# Patient Record
(demographics unavailable — no encounter records)

---

## 2024-12-01 NOTE — CARE PLAN
[Care Plan reviewed and provided to patient/caregiver] : Care plan reviewed and provided to patient/caregiver [Understands and communicates without difficulty] : Patient/Caregiver understands and communicates without difficulty Declined

## 2024-12-01 NOTE — HISTORY OF PRESENT ILLNESS
[EENT/Resp Symptoms] : EENT/RESPIRATORY SYMPTOMS [Runny nose] : runny nose [Ear pain] : ear pain [___ Week(s)] : [unfilled] week(s) [Intermittent] : intermittent [Decreased appetite] : decreased appetite [Known Exposure to COVID-19] : no known exposure to COVID-19 [Hx of recent COVID-19 infection] : no history of recent COVID-19 infection [Sick Contacts: ___] : no sick contacts [Mucoid discharge] : mucoid discharge [Wet cough] : wet cough [At Night] : at night [Acetaminophen] : acetaminophen [Ibuprofen] : ibuprofen [Fever] : fever [Ear Tugging] : ear tugging [Runny Nose] : runny nose [Nasal Congestion] : nasal congestion [Cough] : cough

## 2025-02-14 NOTE — HISTORY OF PRESENT ILLNESS
[de-identified] : runny nose [FreeTextEntry6] : cough, runny nose x 3 days dry cough which is worsening tactile fever x 1 day

## 2025-03-22 NOTE — DISCUSSION/SUMMARY
[] : The components of the vaccine(s) to be administered today are listed in the plan of care. The disease(s) for which the vaccine(s) are intended to prevent and the risks have been discussed with the caretaker.  The risks are also included in the appropriate vaccination information statements which have been provided to the patient's caregiver.  The caregiver has given consent to vaccinate. [FreeTextEntry1] : HIB given

## 2025-04-28 NOTE — HISTORY OF PRESENT ILLNESS
[de-identified] : ear tugging [FreeTextEntry6] : He been tugging on the right ear for like 5 days and crying at night. no fever no ear discharge

## 2025-05-02 NOTE — HISTORY OF PRESENT ILLNESS
[de-identified] : ear pain [FreeTextEntry6] : Runny nose, cough, diarrhea for 6 days and right ear pain  fever x 1 day tmax 101 dry cough loose stool no vomiting

## 2025-05-21 NOTE — HISTORY OF PRESENT ILLNESS
[EENT/Resp Symptoms] : EENT/RESPIRATORY SYMPTOMS [Sick Contacts: ___] : sick contacts: [unfilled] [Sore Throat] : sore throat [Acetaminophen] : acetaminophen [Fever] : fever [___ Day(s)] : [unfilled] day(s) [Known Exposure to COVID-19] : no known exposure to COVID-19 [Hx of recent COVID-19 infection] : no history of recent COVID-19 infection [Nasal Congestion] : no nasal congestion [Cough] : no cough [Shortness of Breath] : no shortness of breath [Tachypnea] : no tachypnea [Decreased Appetite] : no decreased appetite [Vomiting] : no vomiting [Diarrhea] : no diarrhea [Decreased Urine Output] : no decreased urine output [Max Temp: ____] : Max temperature: [unfilled] [Stable] : stable [FreeTextEntry1] : Due to decrease in appetite, mother suspects his throat has been hurting for a week [FreeTextEntry5] : Fever for 1 day